# Patient Record
Sex: FEMALE | Race: WHITE | NOT HISPANIC OR LATINO
[De-identification: names, ages, dates, MRNs, and addresses within clinical notes are randomized per-mention and may not be internally consistent; named-entity substitution may affect disease eponyms.]

---

## 2023-08-19 ENCOUNTER — APPOINTMENT (OUTPATIENT)
Dept: AFTER HOURS CARE | Facility: EMERGENCY ROOM | Age: 68
End: 2023-08-19
Payer: MEDICARE

## 2023-08-19 ENCOUNTER — TRANSCRIPTION ENCOUNTER (OUTPATIENT)
Age: 68
End: 2023-08-19

## 2023-08-19 DIAGNOSIS — U07.1 COVID-19: ICD-10-CM

## 2023-08-19 PROBLEM — Z00.00 ENCOUNTER FOR PREVENTIVE HEALTH EXAMINATION: Status: ACTIVE | Noted: 2023-08-19

## 2023-08-19 PROCEDURE — 99203 OFFICE O/P NEW LOW 30 MIN: CPT | Mod: 95

## 2023-08-19 RX ORDER — NIRMATRELVIR AND RITONAVIR 300-100 MG
20 X 150 MG & KIT ORAL
Qty: 1 | Refills: 0 | Status: ACTIVE | COMMUNITY
Start: 2023-08-19 | End: 1900-01-01

## 2023-08-19 NOTE — HISTORY OF PRESENT ILLNESS
[Home] : at home, [unfilled] , at the time of the visit. [Other Location: e.g. Home (Enter Location, City,State)___] : at [unfilled] [Verbal consent obtained from patient] : the patient, [unfilled] [FreeTextEntry8] : 66 yo F with hx of breast Ca in remission and CHF c/o testing + for COVID today with assoc URI s/s thta started yesterday.  Pt denies any asosc CP or SOB

## 2023-08-19 NOTE — PHYSICAL EXAM
[No Acute Distress] : no acute distress [Well Developed] : well developed [Well Nourished] : well nourished [Well-Appearing] : well-appearing [Normal Sclera/Conjunctiva] : normal sclera/conjunctiva [Normal Outer Ear/Nose] : the outer ears and nose were normal in appearance [No Respiratory Distress] : no respiratory distress  [No Accessory Muscle Use] : no accessory muscle use [Coordination Grossly Intact] : coordination grossly intact [No Focal Deficits] : no focal deficits [Normal Affect] : the affect was normal [Normal Insight/Judgement] : insight and judgment were intact

## 2023-08-19 NOTE — REVIEW OF SYSTEMS
[Fatigue] : fatigue [Nasal Discharge] : nasal discharge [Sore Throat] : sore throat [Postnasal Drip] : postnasal drip [Chest Pain] : no chest pain [Shortness Of Breath] : no shortness of breath [Cough] : cough [Abdominal Pain] : no abdominal pain [FreeTextEntry4] : congestion

## 2024-03-20 ENCOUNTER — NON-APPOINTMENT (OUTPATIENT)
Age: 69
End: 2024-03-20

## 2024-04-03 ENCOUNTER — NON-APPOINTMENT (OUTPATIENT)
Age: 69
End: 2024-04-03

## 2024-04-16 PROBLEM — Z85.3 HISTORY OF BILATERAL BREAST CANCER: Status: ACTIVE | Noted: 2024-04-16

## 2024-04-16 PROBLEM — Z90.13 STATUS POST BILATERAL MASTECTOMY: Status: ACTIVE | Noted: 2024-04-16

## 2024-04-16 NOTE — HISTORY OF PRESENT ILLNESS
[FreeTextEntry1] : Sruthi is a 67 YO F with history of bilateral nipple sparing mastectomies with tissue expander placement on December 8, 2010 [facility] with Dr. Claude Gore.  Right breast 0.95 cm mixed ductal and lobular cancer, grade 7-9, ER/AK positive, HER2 negative.  Left breast 2.2 mm, ER/AK positive, HER2 negative cancer, sentinel nodes negative bilateral.  Right breast Oncotype score 15.  She has been followed by Dr. Michelle Torres, who felt there would be no significant benefit to adjuvant chemotherapy or radiation.  Endocrine therapy was recommended for the patient.  She completed more than 5 years of endocrine therapy and opted against extended endocrine therapy.   Sruthi present today for     She does SBE She has not noticed a change in her breast or a breast lump. She has not noticed a change in her nipple or nipple area. She has not noticed a change in the skin of the breast. She is not experiencing nipple discharge. She is not experiencing breast pain. She has not noticed a lump or lymph node under the armpit.   BREAST CANCER RISK FACTORS   Menarche: Date of LMP: Menopause: Grav:         Para: Age at first live birth: Nursed: Hysterectomy: Oophorectomy: OCP: HRT: Last pap/pelvic exam: Related family history: Ashkenazi: Mastery risk assessment: Genetic testing: Bra size:    Last ultrasound: 11/26/2012                        [facility] Report reviewed.                                         Images reviewed. Results: B4 Right 5:00 2 new adjacent 2-3mm hypoechoic nodule vs complicated cysts. FNA vs core biopsy recommended.

## 2024-04-17 ENCOUNTER — APPOINTMENT (OUTPATIENT)
Dept: BREAST CENTER | Facility: CLINIC | Age: 69
End: 2024-04-17

## 2024-04-17 DIAGNOSIS — Z90.13 ACQUIRED ABSENCE OF BILATERAL BREASTS AND NIPPLES: ICD-10-CM

## 2024-04-17 DIAGNOSIS — Z85.3 PERSONAL HISTORY OF MALIGNANT NEOPLASM OF BREAST: ICD-10-CM

## 2024-09-16 ENCOUNTER — APPOINTMENT (OUTPATIENT)
Dept: HEMATOLOGY ONCOLOGY | Facility: CLINIC | Age: 69
End: 2024-09-16
Payer: MEDICARE

## 2024-09-16 VITALS
HEART RATE: 66 BPM | SYSTOLIC BLOOD PRESSURE: 87 MMHG | RESPIRATION RATE: 16 BRPM | OXYGEN SATURATION: 99 % | HEIGHT: 64 IN | DIASTOLIC BLOOD PRESSURE: 56 MMHG | WEIGHT: 117 LBS | BODY MASS INDEX: 19.97 KG/M2 | TEMPERATURE: 98.3 F

## 2024-09-16 DIAGNOSIS — C50.819 MALIGNANT NEOPLASM OF OVERLAPPING SITES OF UNSPECIFIED FEMALE BREAST: ICD-10-CM

## 2024-09-16 DIAGNOSIS — Z80.51 FAMILY HISTORY OF MALIGNANT NEOPLASM OF KIDNEY: ICD-10-CM

## 2024-09-16 DIAGNOSIS — D68.1 HEREDITARY FACTOR XI DEFICIENCY: ICD-10-CM

## 2024-09-16 DIAGNOSIS — Z17.0 MALIGNANT NEOPLASM OF OVERLAPPING SITES OF UNSPECIFIED FEMALE BREAST: ICD-10-CM

## 2024-09-16 DIAGNOSIS — Z80.3 FAMILY HISTORY OF MALIGNANT NEOPLASM OF BREAST: ICD-10-CM

## 2024-09-16 DIAGNOSIS — I42.9 CARDIOMYOPATHY, UNSPECIFIED: ICD-10-CM

## 2024-09-16 PROCEDURE — 99204 OFFICE O/P NEW MOD 45 MIN: CPT

## 2024-09-16 RX ORDER — LAMOTRIGINE 100 MG/1
100 TABLET ORAL
Qty: 90 | Refills: 0 | Status: ACTIVE | COMMUNITY
Start: 2024-09-05

## 2024-09-16 RX ORDER — EPLERENONE 25 MG/1
25 TABLET, COATED ORAL
Qty: 90 | Refills: 0 | Status: ACTIVE | COMMUNITY
Start: 2024-09-13

## 2024-09-16 RX ORDER — DOXYCYCLINE HYCLATE 100 MG/1
100 TABLET ORAL
Qty: 10 | Refills: 0 | Status: DISCONTINUED | COMMUNITY
Start: 2024-07-08

## 2024-09-16 RX ORDER — CLONAZEPAM 0.5 MG/1
0.5 TABLET ORAL
Qty: 30 | Refills: 0 | Status: ACTIVE | COMMUNITY
Start: 2024-07-02

## 2024-09-16 RX ORDER — SACUBITRIL AND VALSARTAN 24; 26 MG/1; MG/1
24-26 TABLET, FILM COATED ORAL
Qty: 180 | Refills: 0 | Status: DISCONTINUED | COMMUNITY
Start: 2024-06-19

## 2024-09-16 RX ORDER — ATORVASTATIN CALCIUM 20 MG/1
20 TABLET, FILM COATED ORAL
Qty: 90 | Refills: 0 | Status: ACTIVE | COMMUNITY
Start: 2023-12-04

## 2024-09-16 RX ORDER — DAPAGLIFLOZIN 5 MG/1
5 TABLET, FILM COATED ORAL
Qty: 30 | Refills: 0 | Status: ACTIVE | COMMUNITY
Start: 2024-04-12

## 2024-09-16 RX ORDER — SACUBITRIL AND VALSARTAN 24; 26 MG/1; MG/1
24-26 TABLET, FILM COATED ORAL
Refills: 0 | Status: ACTIVE | COMMUNITY

## 2024-09-16 RX ORDER — CARVEDILOL 12.5 MG/1
12.5 TABLET, FILM COATED ORAL
Qty: 180 | Refills: 0 | Status: DISCONTINUED | COMMUNITY
Start: 2024-06-07

## 2024-09-16 RX ORDER — SERTRALINE 25 MG/1
25 TABLET, FILM COATED ORAL
Qty: 90 | Refills: 0 | Status: ACTIVE | COMMUNITY
Start: 2024-07-02

## 2024-09-16 NOTE — BEGINNING OF VISIT
[0] : 1) Little interest or pleasure doing things: Not at all (0) [1] : 2) Feeling down, depressed, or hopeless for several days (1) [[id number]] : 1 [Pain Scale: ___] : On a scale of 1-10, today the patient's pain is a(n) [unfilled]. [Never] : Never [Date Discussed (MM/DD/YY): ___] : Discussed: [unfilled] [Patient/Caregiver not ready to engage] : Patient/Caregiver not ready to engage

## 2024-09-16 NOTE — BEGINNING OF VISIT
[0] : 1) Little interest or pleasure doing things: Not at all (0) [1] : 2) Feeling down, depressed, or hopeless for several days (1) [[id number]] : 1 [Pain Scale: ___] : On a scale of 1-10, today the patient's pain is a(n) [unfilled]. [Never] : Never [Date Discussed (MM/DD/YY): ___] : Discussed: [unfilled] [Patient/Caregiver not ready to engage] : Patient/Caregiver not ready to engage

## 2024-09-16 NOTE — BEGINNING OF VISIT
[0] : 1) Little interest or pleasure doing things: Not at all (0) [1] : 2) Feeling down, depressed, or hopeless for several days (1) [[id number]] : 1 [Pain Scale: ___] : On a scale of 1-10, today the patient's pain is a(n) [unfilled]. [Never] : Never [Date Discussed (MM/DD/YY): ___] : Discussed: [unfilled] [Patient/Caregiver not ready to engage] : Patient/Caregiver not ready to engage

## 2024-09-16 NOTE — BEGINNING OF VISIT
[0] : 1) Little interest or pleasure doing things: Not at all (0) [1] : 2) Feeling down, depressed, or hopeless for several days (1) [[id number]] : 1 [Pain Scale: ___] : On a scale of 1-10, today the patient's pain is a(n) [unfilled]. [Never] : Never [Date Discussed (MM/DD/YY): ___] : Discussed: [unfilled] [Patient/Caregiver not ready to engage] : Patient/Caregiver not ready to engage

## 2024-09-18 PROBLEM — I42.9 CARDIOMYOPATHY: Status: ACTIVE | Noted: 2024-09-16

## 2024-09-18 PROBLEM — C50.819: Status: ACTIVE | Noted: 2024-09-18

## 2024-09-18 PROBLEM — Z80.51 FAMILY HISTORY OF MALIGNANT NEOPLASM OF KIDNEY: Status: ACTIVE | Noted: 2024-09-16

## 2024-09-18 PROBLEM — Z80.3 FAMILY HISTORY OF MALIGNANT NEOPLASM OF FEMALE BREAST: Status: ACTIVE | Noted: 2024-09-16

## 2024-09-18 PROBLEM — D68.1 FACTOR XI DEFICIENCY: Status: ACTIVE | Noted: 2024-09-16

## 2024-09-18 NOTE — HISTORY OF PRESENT ILLNESS
[Disease: _____________________] : Disease: [unfilled] [de-identified] : 68-year-old female presents today for initial consultation of breast cancer, transfer of care from Dr. Michelle Torres.  Patient was diagnosed with bilateral breast cancer ER positive, Her2 negative, diagnosed in 2010, s/p bilateral mastectomy with implants.  Her plastic surgeon was Dr. Claude Gore at [place], ad breast surgeon Dr. Tran.  She was treated with Tamoxifen, and opted to not take it x 10 years, took for 8.5 years.  She also has a history of cardiomyopathy with AICD placement, following with cardiologist at [facility].  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    She states she had breast MRI at [place] 2-3 months ago- EVELYN per pt.

## 2024-09-18 NOTE — HISTORY OF PRESENT ILLNESS
[Disease: _____________________] : Disease: [unfilled] [de-identified] : 68-year-old female presents today for initial consultation of breast cancer, transfer of care from Dr. Michelle Torres.  Patient was diagnosed with bilateral breast cancer ER positive, Her2 negative, diagnosed in 2010, s/p bilateral mastectomy with implants.  Her plastic surgeon was Dr. Claude Gore at [place], ad breast surgeon Dr. Tran.  She was treated with Tamoxifen, and opted to not take it x 10 years, took for 8.5 years.  She also has a history of cardiomyopathy with AICD placement, following with cardiologist at [facility].  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    She states she had breast MRI at [place] 2-3 months ago- EVELYN per pt.

## 2024-09-18 NOTE — ASSESSMENT
[FreeTextEntry1] : 68-year-old female presents today for initial consultation of breast cancer, transfer of care from Dr. Michelle Torres.  Patient was diagnosed with bilateral breast cancer ER positive, Her2 negative, diagnosed in 2010, s/p bilateral mastectomy with implants.  Her plastic surgeon was Dr. Claude Gore at [place], ad breast surgeon Dr. Tran.  She was treated with Tamoxifen, and opted to not take it x 10 years, took for 8.5 years.  She also has a history of cardiomyopathy with AICD placement, following with cardiologist at [facility].  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    She states she had breast MRI at [place] 2-3 months ago- EVELYN per pt.   # Reviewed pathobiology of breast cancer, ER positive tumor, SERM benefit.  # Obtain initial pathology  # Discussed with patient benefit of low fat and low carbohydrate diet.

## 2024-09-18 NOTE — ASSESSMENT
[FreeTextEntry1] : 68-year-old female presents today for initial consultation of breast cancer, transfer of care from Dr. Michelle Torres.  Patient was diagnosed with bilateral breast cancer ER positive, Her2 negative, diagnosed in 2010, s/p bilateral mastectomy with implants.  Her plastic surgeon was Dr. Claude Gore at [place], ad breast surgeon Dr. Tran.  She was treated with Tamoxifen, and opted to not take it x 10 years, took for 8.5 years.  She also has a history of cardiomyopathy with AICD placement, following with cardiologist at [facility].  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    She states she had breast MRI at [place] 2-3 months ago- EVELYN per pt.   # Reviewed pathobiology of breast cancer, ER positive tumor, SERM benefit.  # Obtain initial pathology  # Discussed with patient benefit of low fat and low carbohydrate diet.

## 2024-09-18 NOTE — HISTORY OF PRESENT ILLNESS
[Disease: _____________________] : Disease: [unfilled] [de-identified] : 68-year-old female presents today for initial consultation of breast cancer, transfer of care from Dr. Michelle Torres.  Patient was diagnosed with bilateral breast cancer ER positive, Her2 negative, diagnosed in 2010, s/p bilateral mastectomy with implants.  Her plastic surgeon was Dr. Claude Gore at [place], ad breast surgeon Dr. Tran.  She was treated with Tamoxifen, and opted to not take it x 10 years, took for 8.5 years.  She also has a history of cardiomyopathy with AICD placement, following with cardiologist at [facility].  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    She states she had breast MRI at [place] 2-3 months ago- EVELYN per pt.

## 2024-09-18 NOTE — ASSESSMENT
[FreeTextEntry1] : 68-year-old female presents today for initial consultation of breast cancer, transfer of care from Dr. Michelle Torres.  Patient was diagnosed with bilateral breast cancer ER positive, Her2 negative, diagnosed in 2010, s/p bilateral mastectomy with implants.  Her plastic surgeon was Dr. Claude Gore at [place], ad breast surgeon Dr. Tran.  She was treated with Tamoxifen, and opted to not take it x 10 years, took for 8.5 years.  She also has a history of cardiomyopathy with AICD placement, following with cardiologist at [facility].  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    She states she had breast MRI at [place] 2-3 months ago- EVELYN per pt.   # Reviewed pathobiology of breast cancer, ER positive tumor, SERM benefit.  # Obtain initial pathology  # Discussed with patient benefit of low fat and low carbohydrate diet.

## 2024-09-18 NOTE — ASSESSMENT
[FreeTextEntry1] : 68-year-old female presents today for initial consultation of breast cancer, transfer of care from Dr. Michelle Torres.  Patient was diagnosed with bilateral breast cancer ER positive, Her2 negative, diagnosed in 2010, s/p bilateral mastectomy with implants.  Her plastic surgeon was Dr. Claude Gore at [place], ad breast surgeon Dr. Tran.  She was treated with Tamoxifen, and opted to not take it x 10 years, took for 8.5 years.  She also has a history of cardiomyopathy with AICD placement, following with cardiologist at [facility].  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    She states she had breast MRI at [place] 2-3 months ago- EVELYN per pt.   # Reviewed pathobiology of breast cancer, ER positive tumor, SERM benefit.  # Obtain initial pathology  # Discussed with patient benefit of low fat and low carbohydrate diet.

## 2024-09-18 NOTE — PHYSICAL EXAM
[Fully active, able to carry on all pre-disease performance without restriction] : Status 0 - Fully active, able to carry on all pre-disease performance without restriction [Normal] : affect appropriate [de-identified] : b/l mastectomy

## 2024-09-18 NOTE — HISTORY OF PRESENT ILLNESS
[Disease: _____________________] : Disease: [unfilled] [de-identified] : 68-year-old female presents today for initial consultation of breast cancer, transfer of care from Dr. Michelle Torres.  Patient was diagnosed with bilateral breast cancer ER positive, Her2 negative, diagnosed in 2010, s/p bilateral mastectomy with implants.  Her plastic surgeon was Dr. Claude Gore at [place], ad breast surgeon Dr. Tran.  She was treated with Tamoxifen, and opted to not take it x 10 years, took for 8.5 years.  She also has a history of cardiomyopathy with AICD placement, following with cardiologist at [facility].  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    She states she had breast MRI at [place] 2-3 months ago- EVELYN per pt.

## 2024-09-18 NOTE — PHYSICAL EXAM
[Fully active, able to carry on all pre-disease performance without restriction] : Status 0 - Fully active, able to carry on all pre-disease performance without restriction [Normal] : affect appropriate [de-identified] : b/l mastectomy

## 2024-09-18 NOTE — PHYSICAL EXAM
[Fully active, able to carry on all pre-disease performance without restriction] : Status 0 - Fully active, able to carry on all pre-disease performance without restriction [Normal] : affect appropriate [de-identified] : b/l mastectomy

## 2024-09-18 NOTE — PHYSICAL EXAM
[Fully active, able to carry on all pre-disease performance without restriction] : Status 0 - Fully active, able to carry on all pre-disease performance without restriction [Normal] : affect appropriate [de-identified] : b/l mastectomy

## 2024-09-25 ENCOUNTER — TRANSCRIPTION ENCOUNTER (OUTPATIENT)
Age: 69
End: 2024-09-25

## 2025-03-19 ENCOUNTER — NON-APPOINTMENT (OUTPATIENT)
Age: 70
End: 2025-03-19

## 2025-03-27 ENCOUNTER — APPOINTMENT (OUTPATIENT)
Dept: COLORECTAL SURGERY | Facility: CLINIC | Age: 70
End: 2025-03-27
Payer: MEDICARE

## 2025-03-27 VITALS
BODY MASS INDEX: 19.31 KG/M2 | DIASTOLIC BLOOD PRESSURE: 71 MMHG | SYSTOLIC BLOOD PRESSURE: 113 MMHG | OXYGEN SATURATION: 95 % | HEIGHT: 64 IN | WEIGHT: 113.1 LBS | HEART RATE: 67 BPM

## 2025-03-27 DIAGNOSIS — K64.0 FIRST DEGREE HEMORRHOIDS: ICD-10-CM

## 2025-03-27 DIAGNOSIS — Z12.11 ENCOUNTER FOR SCREENING FOR MALIGNANT NEOPLASM OF COLON: ICD-10-CM

## 2025-03-27 PROCEDURE — 99204 OFFICE O/P NEW MOD 45 MIN: CPT

## 2025-03-27 RX ORDER — CARVEDILOL 12.5 MG/1
12.5 TABLET, FILM COATED ORAL
Refills: 0 | Status: ACTIVE | COMMUNITY

## 2025-03-27 RX ORDER — HYOSCYAMINE SULFATE 0.12 MG/1
0.12 TABLET ORAL
Refills: 0 | Status: ACTIVE | COMMUNITY

## 2025-05-12 ENCOUNTER — NON-APPOINTMENT (OUTPATIENT)
Age: 70
End: 2025-05-12

## 2025-05-14 ENCOUNTER — NON-APPOINTMENT (OUTPATIENT)
Age: 70
End: 2025-05-14

## 2025-05-20 ENCOUNTER — APPOINTMENT (OUTPATIENT)
Dept: COLORECTAL SURGERY | Facility: HOSPITAL | Age: 70
End: 2025-05-20
Payer: MEDICARE

## 2025-05-20 PROCEDURE — 45378 DIAGNOSTIC COLONOSCOPY: CPT

## 2025-09-15 ENCOUNTER — APPOINTMENT (OUTPATIENT)
Dept: HEMATOLOGY ONCOLOGY | Facility: CLINIC | Age: 70
End: 2025-09-15